# Patient Record
Sex: MALE | Race: WHITE | NOT HISPANIC OR LATINO | URBAN - METROPOLITAN AREA
[De-identification: names, ages, dates, MRNs, and addresses within clinical notes are randomized per-mention and may not be internally consistent; named-entity substitution may affect disease eponyms.]

---

## 2017-05-12 ENCOUNTER — OFFICE VISIT - RIVER FALLS (OUTPATIENT)
Dept: FAMILY MEDICINE | Facility: CLINIC | Age: 52
End: 2017-05-12

## 2017-10-13 ENCOUNTER — COMMUNICATION - RIVER FALLS (OUTPATIENT)
Dept: FAMILY MEDICINE | Facility: CLINIC | Age: 52
End: 2017-10-13

## 2017-10-14 LAB
CHOLEST SERPL-MCNC: 222 MG/DL
CHOLEST/HDLC SERPL: 4.9 {RATIO}
GLUCOSE BLD-MCNC: 101 MG/DL (ref 65–99)
HDLC SERPL-MCNC: 45 MG/DL
LDLC SERPL CALC-MCNC: 146 MG/DL
NONHDLC SERPL-MCNC: 177 MG/DL
PSA SERPL-MCNC: 1.7 NG/ML
TRIGL SERPL-MCNC: 173 MG/DL

## 2018-05-22 ENCOUNTER — AMBULATORY - RIVER FALLS (OUTPATIENT)
Dept: FAMILY MEDICINE | Facility: CLINIC | Age: 53
End: 2018-05-22

## 2020-03-18 ENCOUNTER — OFFICE VISIT - RIVER FALLS (OUTPATIENT)
Dept: FAMILY MEDICINE | Facility: CLINIC | Age: 55
End: 2020-03-18

## 2020-04-01 ENCOUNTER — COMMUNICATION - RIVER FALLS (OUTPATIENT)
Dept: FAMILY MEDICINE | Facility: CLINIC | Age: 55
End: 2020-04-01

## 2022-02-11 VITALS
SYSTOLIC BLOOD PRESSURE: 124 MMHG | OXYGEN SATURATION: 95 % | DIASTOLIC BLOOD PRESSURE: 78 MMHG | TEMPERATURE: 97.9 F | HEART RATE: 80 BPM

## 2022-02-16 NOTE — NURSING NOTE
Phone Message    PCP:  K[facility]     Time of Call:  2:52       Person Calling:  Pt  Phone number:  [phone]    Time returned call:  3:03    Note:  Patient called requesting antibiotic for his nasal congestion.  I contacted him to get more information and he stated that his nose is very congested with clear runny discharge for 4 weeks.  He has been using otc Afrin.  I advised to d/c Afrin as this causes rebound congestion after three days.  Patient will try a decongestant and/or antihistamine to help with congestion.  F/U with call to clinic if not better next week.      Transferred to: _

## 2022-02-16 NOTE — PROGRESS NOTES
Patient:   ERWIN TAYLOR            MRN: [id number]            FIN: [id number]               Age:   51 years     Sex:  Male     :  1965   Associated Diagnoses:   Chronic heel pain; TV (tinea versicolor)   Author:   Juliocesar Aleman PA-C      Visit Information   Visit type:  General concerns.    Accompanied by:  No one.    Source of history:  Self, Medical record.    Referral source:  Self.       Chief Complaint   2017 9:04 AM CDT    Sinus congestion x 5 weeks        History of Present Illness             The patient presents with rash.  The location of the rash is bilaterally on the, body, neck, back, chest.  The severity of the symptom(s) associated to the rash is moderate.  The timing/ course of symptom(s) related to the rash is constant.  The rash has lasted for 3 week(s).  Bilateral left > right heel pain. Chronic problem..        Review of Systems   Constitutional:  Negative except as documented in history of present illness.    Respiratory:  Negative.    Cardiovascular:  Negative.    Musculoskeletal:  Negative except as documented in history of present illness.    Integumentary:  Negative except as documented in history of present illness.    Neurologic:  Negative except as documented in history of present illness.       Health Status   Allergies:    Allergic Reactions (All)  No known allergies   Problem list:    All Problems (Selected)  Ex-Smoker / ICD-9-CM V15.82 / Confirmed  External hemorrhoids without complication / SNOMED CT 49532714 / Confirmed  Genital warts / SNOMED CT 018525402 / Confirmed  Obese / ICD-9-.00 / Probable  MARIO (obstructive sleep apnea) / SNOMED CT 885569581 / Confirmed   Medications:  (Selected)   Prescriptions  Prescribed  Selseb 2.25% topical shampoo: 1 lico, top, 2x/wk, # 180 mL, 1 Refill(s), Type: Maintenance, Pharmacy: [id number], 1 lico top 2x/wk  meloxicam 15 mg oral tablet: See Instructions, Instructions: TAKE ONE TABLET BY MOUTH EVERY DAY, # 90 tab(s), 4  Refill(s), Type: Soft Stop, Pharmacy: [facility] #[id number], TAKE ONE TABLET BY MOUTH EVERY DAY  triamcinolone 0.1% topical ointment: 1 lico, TOP, TID, # 30 g, 2 Refill(s), Type: Maintenance, Pharmacy: [facility] #[id number], 1 lico top tid      Histories   Past Medical History:    Active  Genital warts (031838588)  Ex-Smoker (V15.82)   Family History:    Liver cancer  Mother ()  Cancer of prostate  Father     Procedure history:    Esophagogastroduodenoscopy (730429661) on 5/15/2014 at 48 Years.  Comments:  2014 10:52 AM - Hortencia Bustos RN  Sedation: fentanyl, midazolam, cetacaine spray  Indication: dyspepsia  LA grade A refulx esophagitis, erythematous mucosa in prepyloric region (biopsied), erythematous duodenopathy  Clotest negative.  Vasectomy (21608501) on 2010 at 44 Years.  AP lumbar fusion.   Social History:        Alcohol Assessment            Current, social      Tobacco Assessment: Past            Past      Employment and Education Assessment            Work/School description: [employer] self employed.      Exercise and Physical Activity Assessment: Regular exercise      Other Assessment            Marital Status,         Physical Examination   Vital Signs   2017 9:04 AM CDT Temperature Tympanic 97.9 DegF    Peripheral Pulse Rate 80 bpm    HR Method Electronic    Systolic Blood Pressure 124 mmHg    Diastolic Blood Pressure 78 mmHg    Mean Arterial Pressure 93 mmHg    BP Site Right arm    BP Method Manual    Oxygen Saturation 95 %      Measurements from flowsheet : Measurements   2017 9:04 AM CDT    Ht/Wt Measurement Refused by Patient?     Yes     General:  Alert and oriented, No acute distress.    Neck:  Supple, Non-tender, No lymphadenopathy.    Respiratory:  Lungs are clear to auscultation, Respirations are non-labored.    Integumentary:  Warm, Intact, Tinea versicolor rash noted to upper trunk. Rash on feet. Hypertrophic toenails..    Psychiatric:  Cooperative,  Appropriate mood & affect.       Impression and Plan   Diagnosis     Chronic heel pain (ZMB46-RE M79.673).     TV (tinea versicolor) (OQT34-FD B36.0).     Patient Instructions:       Counseled: Patient, Regarding diagnosis, Regarding medications, Activity.    Orders     Orders (Selected)   Prescriptions  Prescribed  Selseb 2.25% topical shampoo: 1 lico, top, 2x/wk, # 180 mL, 1 Refill(s), Type: Maintenance, Pharmacy: [facility] #[id number], 1 lico top 2x/wk.     I will review his records for notes referring to his feet.

## 2022-02-16 NOTE — PROGRESS NOTES
Patient:   ERWIN TAYLOR            MRN: [id number]            FIN: [id number]               Age:   54 years     Sex:  Male     :  1965   Associated Diagnoses:   Osteoarthritis of both ankles   Author:   Juliocesar Aleman PA-C      Report Summary   Diagnosis  Osteoarthritis of both ankles (JIU81-AO M19.071).     Visit Information   Visit type:  Telephone Encounter.    Source of history:  Patient.    Location of patient: Car in [place]  Call Start Time:   1:20  Call End Time:    1:30      Chief Complaint   Needs letter for 's       History of Present Illness   Today's visit was conducted via telephone due to the COVID-19 pandemic.     Reason for visit:  Needs updated letter for [facility] for possible disability benefits. No change in his condition.      Review of Systems   Musculoskeletal:  Joint pain, Decreased range of motion.       Impression and Plan   Diagnosis     Osteoarthritis of both ankles (MFM69-XG M19.071).     Letter updated. Copy to [facility] and patient per his request.      Health Status   Allergies:    Allergic Reactions (Selected)  No known allergies   Medications:  (Selected)   Prescriptions  Prescribed  Selseb 2.25% topical shampoo: 1 lico, top, 2x/wk, # 180 mL, 1 Refill(s), Type: Maintenance, Pharmacy: [facility] #[id number], 1 lico top 2x/wk  meloxicam 15 mg oral tablet: = 1 tab(s) ( 15 mg ), Oral, daily, # 30 tab(s), 0 Refill(s), Type: Soft Stop, Pharmacy: [facility] [id number] [facility], 1 tab(s) Oral daily  traMADol 50 mg oral tablet: 1 tab(s) ( 50 mg ), PO, q4hr, PRN: for pain, # 60 tab(s), 0 Refill(s), Type: Maintenance, Pharmacy: [facility] #[id number], 1 tab(s) po q4 hrs,PRN:for pain  triamcinolone 0.1% topical ointment: 1 lico, TOP, TID, # 30 g, 2 Refill(s), Type: Maintenance, Pharmacy: [facility] #[id number], 1 lico top tid   Problem list:    All Problems  MARIO (obstructive sleep apnea) / SNOMED CT 932901643 / Confirmed  Obese / ICD-9-.00 / Probable  Genital warts / SNOMED CT  673515980 / Confirmed  External hemorrhoids without complication / SNOMED CT 95724319 / Confirmed  Ex-Smoker / ICD-9-CM V15.82 / Confirmed  Canceled: Tobacco Abuse-Unspec / ICD-9-.1

## 2022-02-16 NOTE — TELEPHONE ENCOUNTER
---------------------  From: Jannet Fischer CMA (Phone Messages Pool ([id number]_[place]  [place]))   To: Juliocesar Aleman PA-C;     Sent: 2/21/2019 10:41:36 AM CST  Subject: Phone Messge : Medication Management     PCP:   KAH     Time of Call:  1018am       Person Calling:  Toni  Phone number:  [phone] message okay    Returned call at: 1020am    Note:   Patient called and stated that he was able to get insurance with the [facility] and has an up coming appointment with them next week. He is wondering if RAMO would be able to send in a 30 day supply of Meloxicam 15mg for him. Patient is also wondering if there is something he can take for congestion and a productive cough.     Patient also wanted to let RAMO know that he has lost 30 pounds and is doing good health wise    Last office visit and reason:       Pharmacy: [facility] [place]    FWD to: KAH---------------------  From: Juliocesar Aleman PA-C   To: Phone Messages [employer] ([id number]_[place]);     Sent: 2/21/2019 10:44:12 AM CST  Subject: RE: Phone Messge : Medication Management     Mucinex.  I sent in the Meloxicam.    KAH

## 2022-02-16 NOTE — TELEPHONE ENCOUNTER
---------------------  From: Jannet Fischer CMA (Phone Messages [employer] ([id number]_[facility])   To: Juliocesar Aleman PA-C;     Sent: 4/1/2020 4:04:14 PM CDT  Subject: General Message     Patient called and stopped in clinic to explain what he needs in his letter that RAMO wrote on March 18th 2020. 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 Some of the documents that I made copies of are hard to read to age. Patient states that he passed the physical to get into the service on February 1983. The ankle pain started in October of 1983 and some of the doctor notes that patient provided note this, they are hard to read due to the age of them. The notes are from when patient was stationed at [facility]. Patient also stated that he has been seeing KAH since 2006 for the chronic ankle pain until he [facility]. Patient is trying to get disability from being in the service and hopes that this letter will help prove that he can receive it.     Documents our on [facility] desk to review.     Patient would also like to speak to RAMO if he has any questions about this letter.     Once letter is completed please mail it to patient.     Jannet BOLAÑOS CMA---------------------  From: Juliocesar Aleman PA-C   To: Phone Messages [employer] ([id number]_[facility]);     Sent: 4/2/2020 3:24:57 PM CDT  Subject: RE: General Message     I will not physically be in clinic until the week of April 13. Please fax the information to  so I can have access to it when i am in clinic. Please notify patient that I will be unable to get to this until the week of the 13th. If that doesn't work, let me know and I can come to the clinic this weekend and work on it.    KAHTime: 3:41 pm  Note:    Patient is ok to wait. Patient would like both providers [facility] and [facility] to sign the letter, if possible. Patient states he has seen both  providers.---------------------  From: Miri Reyes CMA (Phone Messages Pool ([id number]_[place] - [place]))   To: Juliocesar Aleman PA-C;     Sent: 4/2/2020 3:45:19 PM CDT  Subject: FW: General Message

## 2022-02-16 NOTE — TELEPHONE ENCOUNTER
---------------------  From: Juliocesar Aleman PA-C   To: Phone Messages Pool ([id number]_[facility]);     Sent: 4/13/2020 8:40:12 AM CDT  Subject: RE: KATI      I dictated something on Saturday.  Please have Amelia check the system for when it gets transcribed.  I can have staff at  print and I can sign.       ---------------------  From: Deb Crook MA (Phone Messages Pool ([id number]_[facility]))   To: Juliocesar Aleman PA-C;     Sent: 4/13/2020 8:38:21 AM CDT  Subject: KATI      I had Amelia fax you over paperwork to  that was sitting on your desk regarding patient. Post-it note on paper work Amelia did not send to you says -Need evidence of a diagnosed condition for both ankles.

## 2022-02-16 NOTE — NURSING NOTE
Phone Message    PCP:   KAH      Time of Call:  1:03 pm    Phone number:  [phone]    Returned call at: 2:00 pm    Note:   Pt called looking for his lab results. Sent through the portal and he does not use that. He would like them mailed to his home. I read him the letter and discussed his elevated glucose and that he is at risk for diabetes. Juliocesar wants to have this rechecked in the next three months. We wanted some information on how to get these numbers down. I printed off some education that I will also mail him. He is going to try and cut back on the alcohol as well.    Pharmacy: n/a    Last office visit and reason: 05/12/17    Transferred to: n/a